# Patient Record
Sex: FEMALE | Race: WHITE | URBAN - METROPOLITAN AREA
[De-identification: names, ages, dates, MRNs, and addresses within clinical notes are randomized per-mention and may not be internally consistent; named-entity substitution may affect disease eponyms.]

---

## 2022-06-13 ENCOUNTER — HOSPITAL ENCOUNTER (OUTPATIENT)
Dept: PHYSICAL THERAPY | Age: 32
Discharge: HOME | End: 2022-06-13
Payer: OTHER GOVERNMENT

## 2022-06-13 PROCEDURE — 97110 THERAPEUTIC EXERCISES: CPT

## 2022-06-13 PROCEDURE — 97162 PT EVAL MOD COMPLEX 30 MIN: CPT

## 2022-06-13 NOTE — PROGRESS NOTES
PT DAILY TREATMENT NOTE/ EVAL    Patient Name: Michael Ceron  Date:2022  : 1990  [x]  Patient  Verified  Payor: [id number] / Plan: Ilya Christensen 73 / Product Type:  /    In time:1211 pm Out time:1303 pm  Total Treatment Time (min): 52  Visit #: 1 of 10    Medicare/BCBS Only   Total Timed Codes (min):  n/a 1:1 Treatment Time:  n/a     Treatment Area: Right shoulder pain [M25.511]    SUBJECTIVE  Pain Level (0-10 scale): 3/10  []constant []intermittent []improving []worsening []no change since onset    Any medication changes, allergies to medications, adverse drug reactions, diagnosis change, or new procedure performed?: See summary sheet. Subjective functional status/changes:     SEE POC    OBJECTIVE/EXAMINATION  SEE POC    32 min [x]Eval                  []Re-Eval       15 min Therapeutic Exercise:  [x] See flow sheet: review and establish initial HEP      Rationale: increase ROM and increase strength to improve the patients ability to perform lifting, carrying with greater ease and decreased discomfort   5 min Therapeutic Activity:  Patient / Caregiver education and instruction: PT POC, PT assessment findings, diagnosis, prognosis. Rationale: to improve the patients ability to adhere to HEP and therapy sessions for increased compliance when working toward therapy goals. With   [x] TE   [x] TA   [] neuro   [] other: Patient Education: [x] Review HEP    [] Progressed/Changed HEP based on:   [] positioning   [] body mechanics   [] transfers   [] heat/ice application    [] other: pt issued OTB for HEP      Other Objective/Functional Measures: SEE POC      Other tests/comments: SEE POC       Pain Level (0-10 scale) post treatment: \"feels pretty good right now\"    ASSESSMENT/Changes in Function: Pt was instructed in initial HEP. Pt was given hand out detailing exercises and instructed in modification of exercises to tolerance, and in performing exercises safely.  Pt verbalized understanding. Patient required demonstration, skilled verbal cues, and skilled tactile cues to perform therex with proper form and scapular activation/engagement. SEE POC    Patient will benefit from skilled PT services to modify and progress therapeutic interventions, address functional mobility deficits, address ROM deficits, address strength deficits, analyze and address soft tissue restrictions, analyze and cue movement patterns, analyze and modify body mechanics/ergonomics, assess and modify postural abnormalities, address imbalance/dizziness and instruct in home and community integration to attain goals and maximize pt's functional status. [x]  See Plan of Care  []  See progress note/recertification  []  See Discharge Summary         Progress towards goals / Updated goals:  Goals established at time of evaluation     PLAN  []  Upgrade activities as tolerated     [x]  Continue plan of care  []  Update interventions per flow sheet       []  Discharge due to:_  [x]  Other: Pt will benefit from skilled OP PT 2 x a week for 5 weeks in order to address impairments and maximize functional status.      Enedina Jim, PT 6/13/2022  1:44 PM        Future Appointments   Date Time Provider Nawaf Hopkins   6/17/2022  1:00 PM Bhavik Gonzalez, PT [facility]   6/21/2022  2:15 PM Candie Simmons, [facility]   6/24/2022 12:45 PM Saman Alvares [facility]   6/28/2022  2:00 PM Saman Alvares [facility]   6/30/2022  1:30 PM Candie Simmons [facility]   7/5/2022  1:30 PM Candie Simmons, [facility]   7/7/2022  1:30 PM Candie Simmons, [facility]   7/12/2022  2:00 PM Saman Alvares [facility]   7/14/2022  1:30 PM Rosa Fitzpatrick, ED [address]
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
## 2022-06-17 ENCOUNTER — HOSPITAL ENCOUNTER (OUTPATIENT)
Dept: PHYSICAL THERAPY | Age: 32
Discharge: HOME | End: 2022-06-17
Payer: OTHER GOVERNMENT

## 2022-06-17 PROCEDURE — 97110 THERAPEUTIC EXERCISES: CPT

## 2022-06-17 PROCEDURE — 97140 MANUAL THERAPY 1/> REGIONS: CPT

## 2022-06-17 NOTE — PROGRESS NOTES
PHYSICAL THERAPY - DAILY TREATMENT NOTE      Patient Name: Chloe Hernandez        Date: 2022  : 1990   YES Patient  Verified  Visit #:   2   of   10  Insurance: Payor:  / Plan: Avinash Teran / Product Type:  /      In time: 1:05 Out time: 1:50   Total Treatment Time: 45     Medicare/[facility] Time Tracking (below)   Total Timed Codes (min):  N/A 1:1 Treatment Time:  N/A     TREATMENT AREA =  Right shoulder pain [M25.511]    SUBJECTIVE    Pain Level (on 0 to 10 scale):  2  / 10   Medication Changes/New allergies or changes in medical history, any new surgeries or procedures?     NO    If yes, update Summary List   Subjective Functional Status/Changes:  []  No changes reported       Functional improvements: sitting  Functional impairments: overhead reach, push/pull         OBJECTIVE  Modalities Rationale:     decrease pain to improve patient's ability to reach overhead   min [] Estim, type/location:                                      []  att     []  unatt     []  w/US     []  w/ice    []  w/heat    min []  Mechanical Traction: type/lbs                   []  pro   []  sup   []  int   []  cont    []  before manual    []  after manual    min []  Ultrasound, settings/location:      min []  Iontophoresis w/ dexamethasone, location:                                               []  take home patch       []  in clinic   10 min []  Ice     [x]  Heat    location/position: Supine right shoulder    min []  Vasopneumatic Device, press/temp:        min []  Other:    [x] Skin assessment post-treatment (if applicable):    []  intact    [x]  redness- no adverse reaction                  []redness - adverse reaction:      10 min Therapeutic Exercise:  [x]  See flow sheet   Rationale:      increase ROM and increase strength to improve the patients ability to reach overhead     25 min Manual Therapy: Technique:      [] S/DTM []IASTM []PROM [] Passive Stretching   []manual TPR    []Jt manipulation:Gr I [] II []  III [] IV[] V[]  Treatment Area:  SOR, DTM bilat UT, right pec; prone left>right mid TS DTM PA mobs grade IV   Rationale:      decrease pain, increase ROM and increase tissue extensibility to improve patient's ability to overhead reach    Billed With/As:   [x] TE   [] TA   [] Neuro   [] Self Care Patient Education: [x] Review HEP    [] Progressed/Changed HEP based on:   [] positioning   [] body mechanics   [] transfers   [] heat/ice application    [] other:      Other Objective/Functional Measures:    Patient tolerated therex progression well without complaints of increased pain. Post Treatment Pain Level (on 0 to 10) scale:   2  / 10     ASSESSMENT    Assessment/Changes in Function:     Continue to focus on thoracic mobility and scapular stability. []  See Progress Note/Recertification   Patient will continue to benefit from skilled PT services to modify and progress therapeutic interventions, address functional mobility deficits, address ROM deficits, address strength deficits, analyze and address soft tissue restrictions, analyze and cue movement patterns, analyze and modify body mechanics/ergonomics and assess and modify postural abnormalities to attain remaining goals. to attain remaining goals. Progress toward goals / Updated goals:    Fair Progress to    [x] STG    [] LTG  1 as shown by good compliance with HEP.      PLAN    [x]  Upgrade activities as tolerated YES Continue plan of care   []  Discharge due to :    []  Other:      Therapist: Cheryle Blacksmith, PT    Date: 6/17/2022 Time: 1:27 PM     Future Appointments   Date Time Provider Nawaf Hopkins   6/21/2022  2:15 PM Felipe Garza [facility]   6/24/2022 12:[facility]   6/28/2022  2:00 PM [facility]   6/30/2022  1:30 PM Pretty Fitzpatrick [facility]   7/5/2022  1:30 PM Felipe Garza [facility]   7/7/2022  1:30 PM Felipe Garza PTA [facility]   7/12/2022  2:00 PM Irineo Mayen, [facility]   7/14/2022  1:30 PM Hayde Fitzpatrick, ED [address]

## 2022-06-21 ENCOUNTER — HOSPITAL ENCOUNTER (OUTPATIENT)
Dept: PHYSICAL THERAPY | Age: 32
Discharge: HOME | End: 2022-06-21
Payer: OTHER GOVERNMENT

## 2022-06-21 PROCEDURE — 97140 MANUAL THERAPY 1/> REGIONS: CPT

## 2022-06-21 PROCEDURE — 97535 SELF CARE MNGMENT TRAINING: CPT

## 2022-06-21 NOTE — PROGRESS NOTES
PHYSICAL THERAPY - DAILY TREATMENT NOTE      Patient Name: Senora Nageotte        Date: 2022  : 1990   YES Patient  Verified  Visit #:   3      10  Insurance: Payor:  / Plan: Ilya Christensen 73 / Product Type:  /      In time: 215 Out time: 310   Total Treatment Time: 55     Medicare/BCBS Time Tracking (below)   Total Timed Codes (min):  N/A 1:1 Treatment Time:  N/A     TREATMENT AREA =  Right shoulder pain [M25.511]    SUBJECTIVE    Pain Level (on 0 to 10 scale):  4  / 10   Medication Changes/New allergies or changes in medical history, any new surgeries or procedures? NO    If yes, update Summary List   Subjective Functional Status/Changes:  []  No changes reported     Patient reports she was feeling better post last session until she wore a backpack [place] the weekend [place] [facility] [place] [address]      OBJECTIVE  Modalities Rationale:     decrease pain to improve patient's ability to reach overhead   min [] Estim, type/location:                                      []  att     []  unatt     []  w/US     []  w/ice    []  w/heat    min []  Mechanical Traction: type/lbs                   []  pro   []  sup   []  int   []  cont    []  before manual    []  after manual    min []  Ultrasound, settings/location:      min []  Iontophoresis w/ dexamethasone, location:                                               []  take home patch       []  in clinic   10 min []  Ice     [x]  Heat    location/position: Supine right shoulder, then L shoulder, then sacrum    min []  Vasopneumatic Device, press/temp:        min []  Other:    [x] Skin assessment post-treatment (if applicable):    []  intact    [x]  redness- no adverse reaction                  []redness - adverse reaction:      5 min Therapeutic Exercise:  [x]  See flow sheet   Rationale:      increase ROM and increase strength to improve the patients ability to reach overhead     30 min Manual Therapy: Technique:      [x] S/DTM []IASTM []PROM [] Passive Stretching   []manual TPR    []Jt manipulation:Gr I [] II []  III [] IV[] V[]  Treatment Area:  SOR, DTM bilat UT, prone left>right mid TS DTM   Rationale:      decrease pain, increase ROM and increase tissue extensibility to improve patient's ability to overhead reach  10 min Self Care ADLs: Review of HEP and demonstration for form correction. Rationale:    increase ROM, increase strength, improve coordination, improve balance and increase proprioception to improve the patients ability to facilitate carryover between visits. Billed With/As:   [x] TE   [] TA   [] Neuro   [] Self Care Patient Education: [x] Review HEP    [] Progressed/Changed HEP based on:   [] positioning   [] body mechanics   [] transfers   [] heat/ice application    [] other:      Other Objective/Functional Measures:    Reviewed stretches and strengthening exercises. Had patient demo for understanding. Notable TP in L Rhomboid, R SCM     Post Treatment Pain Level (on 0 to 10) scale:   2  / 10     ASSESSMENT    Assessment/Changes in Function:     Continue to focus on thoracic mobility and scapular stability. []  See Progress Note/Recertification   Patient will continue to benefit from skilled PT services to modify and progress therapeutic interventions, address functional mobility deficits, address ROM deficits, address strength deficits, analyze and address soft tissue restrictions, analyze and cue movement patterns, analyze and modify body mechanics/ergonomics and assess and modify postural abnormalities to attain remaining goals. to attain remaining goals. Progress toward goals / Updated goals:    Fair Progress to    [x] STG    [] LTG  1 as shown by good compliance with HEP.      PLAN    [x]  Upgrade activities as tolerated YES Continue plan of care   []  Discharge due to :    []  Other:      Therapist: Pricilla Cordova PTA    Date: 6/21/2022 Time: 3:30 PM     Future Appointments   Date Time Provider Department [place]   6/24/2022 12:45 PM Darell [facility]   6/28/2022  2:00 PM Jimmie Cunha [facility]   6/30/2022  1[facility]30 [facility]   7/5/2022  1:30 PM [facility]   7/7/2022  1:30 PM [facility]   7/12/2022  2:00 PM Darell [facility]   7/14/2022  1:30 [facility]

## 2022-06-24 ENCOUNTER — APPOINTMENT (OUTPATIENT)
Dept: PHYSICAL THERAPY | Age: 32
End: 2022-06-24
Payer: OTHER GOVERNMENT

## 2022-06-24 ENCOUNTER — TELEPHONE (OUTPATIENT)
Dept: PHYSICAL THERAPY | Age: 32
End: 2022-06-24

## 2022-06-28 ENCOUNTER — APPOINTMENT (OUTPATIENT)
Dept: PHYSICAL THERAPY | Age: 32
End: 2022-06-28
Payer: OTHER GOVERNMENT

## 2022-06-30 ENCOUNTER — APPOINTMENT (OUTPATIENT)
Dept: PHYSICAL THERAPY | Age: 32
End: 2022-06-30
Payer: OTHER GOVERNMENT

## 2022-07-01 ENCOUNTER — TELEPHONE (OUTPATIENT)
Dept: PHYSICAL THERAPY | Age: 32
End: 2022-07-01

## 2022-07-05 ENCOUNTER — APPOINTMENT (OUTPATIENT)
Dept: PHYSICAL THERAPY | Age: 32
End: 2022-07-05

## 2022-07-07 ENCOUNTER — APPOINTMENT (OUTPATIENT)
Dept: PHYSICAL THERAPY | Age: 32
End: 2022-07-07

## 2022-07-09 NOTE — PROGRESS NOTES
[address] [place] [address] PHYSICAL THERAPY  [address] [place] [address] 201,[facility], [facility] - Phone: [phone]  Fax: [phone]    DISCHARGE NOTE  Patient Name: Senora Nageotte : 1990   Treatment/Medical Diagnosis: Right shoulder pain [M25.511]   Referral Source: Sue Rodarte MD     Date of Initial Visit: 22 Attended Visits: 3 Missed Visits: n/a                  SUMMARY OF TREATMENT  Pt attended only initial evaluation and     2     follow-ups and then did not return. Therefore a formal reassessment of goals was not performed. RECOMMENDATIONS  Discontinue physical therapy due to patient not returning. If you have any questions/comments please contact us directly at [phone]. Thank you for allowing us to assist in the care of your patient.     Therapist Signature: Jeni Sims Date: 2022     Time: 7:22 PM

## 2022-07-12 ENCOUNTER — APPOINTMENT (OUTPATIENT)
Dept: PHYSICAL THERAPY | Age: 32
End: 2022-07-12

## 2022-07-14 ENCOUNTER — APPOINTMENT (OUTPATIENT)
Dept: PHYSICAL THERAPY | Age: 32
End: 2022-07-14